# Patient Record
Sex: FEMALE | Race: WHITE | URBAN - METROPOLITAN AREA
[De-identification: names, ages, dates, MRNs, and addresses within clinical notes are randomized per-mention and may not be internally consistent; named-entity substitution may affect disease eponyms.]

---

## 2017-07-06 ENCOUNTER — TELEPHONE (OUTPATIENT)
Dept: FAMILY MEDICINE | Facility: CLINIC | Age: 52
End: 2017-07-06

## 2017-07-06 ENCOUNTER — OFFICE VISIT (OUTPATIENT)
Dept: FAMILY MEDICINE | Facility: CLINIC | Age: 52
End: 2017-07-06

## 2017-07-06 VITALS — WEIGHT: 164 LBS | TEMPERATURE: 98.5 F | HEIGHT: 66 IN

## 2017-07-06 DIAGNOSIS — H60.331 ACUTE SWIMMER'S EAR OF RIGHT SIDE: Primary | ICD-10-CM

## 2017-07-06 PROCEDURE — 99212 OFFICE O/P EST SF 10 MIN: CPT | Performed by: FAMILY MEDICINE

## 2017-07-06 RX ORDER — CIPROFLOXACIN AND DEXAMETHASONE 3; 1 MG/ML; MG/ML
4 SUSPENSION/ DROPS AURICULAR (OTIC) 2 TIMES DAILY
Qty: 7.5 ML | Refills: 0 | Status: SHIPPED | OUTPATIENT
Start: 2017-07-06 | End: 2017-07-13

## 2017-07-06 RX ORDER — NEOMYCIN SULFATE, POLYMYXIN B SULFATE AND HYDROCORTISONE 10; 3.5; 1 MG/ML; MG/ML; [USP'U]/ML
4 SUSPENSION/ DROPS AURICULAR (OTIC) 4 TIMES DAILY
Qty: 10 ML | Refills: 0 | Status: SHIPPED | OUTPATIENT
Start: 2017-07-06

## 2017-07-06 NOTE — PROGRESS NOTES
"SUBJECTIVE:                                                    Judie Gallagher is a 52 year old female who presents to clinic today for the following health issues:    ENT Symptoms             Symptoms: cc Present Absent Comment   Fever/Chills   x    Fatigue   x    Muscle Aches   x    Eye Irritation   x    Sneezing   x    Nasal Raul/Drg   x    Sinus Pressure/Pain   x    Loss of smell   x    Dental pain   x    Sore Throat   x    Swollen Glands   x    Ear Pain/Fullness x x  Right ear and jaw pain   Cough   x    Wheeze   x    Chest Pain   x    Shortness of breath   x    Rash   x    Other   x      Symptom duration:  Saturday   Symptom severity: moderate   Treatments tried:  Advil and excederin   Contacts:  Not that she is aware of. She believes that it is from swimming.         Problem list and histories reviewed & adjusted, as indicated.  Additional history:     There is no problem list on file for this patient.    History reviewed. No pertinent surgical history.    Social History   Substance Use Topics     Smoking status: Never Smoker     Smokeless tobacco: Not on file     Alcohol use Yes      Comment: rare     History reviewed. No pertinent family history.        ROS:  Constitutional, HEENT, cardiovascular, pulmonary, gi and gu systems are negative, except as otherwise noted.    OBJECTIVE:                                                    Temp 98.5  F (36.9  C) (Temporal)  Ht 5' 6\" (1.676 m) There is no height or weight on file to calculate BMI.   GENERAL:: healthy, alert and no distress  HENT: right external canal swollen red with purulant discharge Nose- normal; Mouth- no ulcers, no lesions       ASSESSMENT/PLAN:                                                      (H60.331) Acute swimmer's ear of right side  (primary encounter diagnosis  Plan: ciprofloxacin-dexamethasone (CIPRODEX) otic         suspension        return to clinic or call if unimproved in 3-4 days sooner if worse.         reports that she has never " smoked. She does not have any smokeless tobacco history on file.    [facility]

## 2017-07-06 NOTE — TELEPHONE ENCOUNTER
Reason for Call:  Other prescription    Detailed comments: Judie is at the [facility] and her cipro-dex medication is well over $200.00 and pharmacist was asking if it could be changed to:  Neomycin-polymyxinb sulfate and hydrocodone otic solution 10ml bottle?    Patient @ pharmacy waiting.  Please review and advise. Thank you..Maday Mcclendon    Phone Number Patient can be reached at: Other phone number:  [phone]    Best Time: during business hours    Can we leave a detailed message on this number? YES    Call taken on 7/6/2017 at 11:53 AM by Maday Mcclendon

## 2017-07-06 NOTE — NURSING NOTE
"Chief Complaint   Patient presents with     Ear Problem       Initial There were no vitals taken for this visit. Estimated body mass index is 27.92 kg/(m^2) as calculated from the following:    Height as of 1/21/14: 5' 6\" (1.676 m).    Weight as of 1/21/14: 173 lb (78.5 kg).  Medication Reconciliation: complete   Celine Cortés CMA      "

## 2017-07-06 NOTE — MR AVS SNAPSHOT
"              After Visit Summary   2017    Judie Gallagher    MRN: [id number]           Patient Information     Date Of Birth          1965        Visit Information        Provider Department      2017 10:20 AM Jose Hines MD [facility]        Today's Diagnoses     Acute swimmer's ear of right side    -  1       Follow-ups after your visit        Who to contact     Normal or non-critical lab and imaging results will be communicated to you by Evihart, letter or phone within 4 business days after the clinic has received the results. If you do not hear from us within 7 days, please contact the clinic through MyChart or phone. If you have a critical or abnormal lab result, we will notify you by phone as soon as possible.  Submit refill requests through Moqom or call your pharmacy and they will forward the refill request to us. Please allow 3 business days for your refill to be completed.          If you need to speak with a  for additional information , please call: [phone]             Additional Information About Your Visit        Moqom Information     Moqom lets you send messages to your doctor, view your test results, renew your prescriptions, schedule appointments and more. To sign up, go to [email or web address] . Click on \"Log in\" on the left side of the screen, which will take you to the Welcome page. Then click on \"Sign up Now\" on the right side of the page.     You will be asked to enter the access code listed below, as well as some personal information. Please follow the directions to create your username and password.     Your access code is: [email or web address]  Expires: 10/8/2017  2:28 PM     Your access code will  in 90 days. If you need help or a new code, please call your [facility] clinic or [phone].        Care EveryWhere ID     This is your Care EveryWhere ID. This could be used by other organizations to access your [facility] medical " "records  [id number]        Your Vitals Were     Temperature Height                98.5  F (36.9  C) (Temporal) 5' 6\" (1.676 m)           Blood Pressure from Last 3 Encounters:   01/21/14 155/78   09/20/13 144/74   12/24/12 131/70    Weight from Last 3 Encounters:   01/21/14 173 lb (78.5 kg)              Today, you had the following     No orders found for display         Today's Medication Changes          These changes are accurate as of: 7/6/17 11:59 PM.  If you have any questions, ask your nurse or doctor.               Start taking these medicines.        Dose/Directions    ciprofloxacin-dexamethasone otic suspension   Commonly known as:  CIPRODEX   Used for:  Acute swimmer's ear of right side   Started by:  Jose Hines MD        Dose:  4 drop   Place 4 drops into the right ear 2 times daily for 7 days   Quantity:  7.5 mL   Refills:  0       neomycin-polymyxin-hydrocortisone 3.5-35512-3 otic suspension   Commonly known as:  CORTISPORIN   Used for:  Acute swimmer's ear of right side   Started by:  Jose Hines MD        Dose:  4 drop   Place 4 drops in ear(s) 4 times daily   Quantity:  10 mL   Refills:  0            Where to get your medicines      These medications were sent to [facility] [address] [place] [address]  [address] [facility] [address]     Phone:  [phone]     ciprofloxacin-dexamethasone otic suspension    neomycin-polymyxin-hydrocortisone 3.5-62335-0 otic suspension                Primary Care Provider Office Phone # Fax #    Triston Rodrigues [phone]       [facility] [address] [facility] [address]        Equal Access to Services     [facility]: Hadii aad ku hadasho Soomaali, waaxda luqadaha, qaybta kaalmada adeegyada, cayden saldañan anibal velez. So [facility] [phone].    ATENCIÓN: Si habla español, tiene a monreal disposición servicios gratuitos de asistencia lingüística. Llame al [phone].    We " comply with applicable federal civil rights laws and [place] laws. We do not discriminate on the basis of race, color, national origin, age, disability sex, sexual orientation or gender identity.            Thank you!     Thank you for choosing [facility]  for your care. Our goal is always to provide you with excellent care. Hearing back from our patients is one way we can continue to improve our services. Please take a few minutes to complete the written survey that you may receive in the mail after your visit with us. Thank you!             Your Updated Medication List - Protect others around you: Learn how to safely use, store and throw away your medicines at www.disposemymeds.org.          This list is accurate as of: 7/6/17 11:59 PM.  Always use your most recent med list.                   Brand Name Dispense Instructions for use Diagnosis    ciprofloxacin-dexamethasone otic suspension    CIPRODEX    7.5 mL    Place 4 drops into the right ear 2 times daily for 7 days    Acute swimmer's ear of right side       neomycin-polymyxin-hydrocortisone 3.5-93139-2 otic suspension    CORTISPORIN    10 mL    Place 4 drops in ear(s) 4 times daily    Acute swimmer's ear of right side

## 2017-11-16 ENCOUNTER — TELEPHONE (OUTPATIENT)
Dept: FAMILY MEDICINE | Facility: CLINIC | Age: 52
End: 2017-11-16

## 2017-11-16 NOTE — TELEPHONE ENCOUNTER
Panel Management Review      Patient has the following on her problem list: None      Composite cancer screening  Chart review shows that this patient is due/due soon for the following Pap Smear, Mammogram and Colonoscopy  Summary:    Patient is due/failing the following:   COLONOSCOPY, LDL, MAMMOGRAM, PAP and PHYSICAL    Action needed:   Patient needs office visit for pap and physical., Patient needs fasting lab only appointment and Patient needs referral/order: mammogram and colonoscopy.    Type of outreach:    Phone, left message for patient to call back.     Questions for provider review:    None                                                                                                                                    Celine Cortés CMA       Chart routed to self.

## 2018-01-12 NOTE — TELEPHONE ENCOUNTER
Judie called back and she is not a patient of our clinic.  She does go else. She only came here once in July, 2017 for an ear infection.  Please remove from list.  Thank you..Maday Mcclendon

## 2018-05-18 ENCOUNTER — TELEPHONE (OUTPATIENT)
Dept: FAMILY MEDICINE | Facility: CLINIC | Age: 53
End: 2018-05-18

## 2018-05-18 NOTE — TELEPHONE ENCOUNTER
Panel Management Review      Patient has the following on her problem list: None      Composite cancer screening  Chart review shows that this patient is due/due soon for the following Pap Smear, Mammogram and Colonoscopy  Summary:    Patient is due/failing the following:   physcial, COLONOSCOPY, LDL, MAMMOGRAM and PAP    Action needed:   Patient needs office visit for physical and pap. and Patient needs referral/order: mammogram and colonoscopy    Type of outreach:    please recall    Questions for provider review:    None                                                                                                                                    Celine Cortés CMA     Chart routed to self.

## 2018-05-18 NOTE — LETTER
Felicita 15, 2018      Judie Gallagher  [address] [facility] [address]        Dear Judie,     As part of [facility]'s commitment to health and wellness we have reviewed your chart and it indicates that you are due for one or more of the following:    -- Pap smear. There is no recent pap on file. These are recommended every 3 years. Please call our clinic to schedule your pap smear / physical appointment.     -- Mammogram. There is no recent mammogram on file.   Please call one of the following numbers to schedule:  ** [facility] [phone] (at [facility] once a month)  [facility] [phone]  [facility] [phone]  [facility] [phone]  [facility] [phone]  [facility] [phone]    -- Colon screen. Colonoscopy or FIT test. One of these tests is recommended at age 50 to screen for colon cancer. There is no recent colonoscopy or FIT test on file.   Please call one of the following numbers to schedule a colonoscopy:  [facility] [phone]  [facility] [phone]  [facility] [phone]  [facility] [phone] (multiple sites, call for locations)    A colonoscopy is the gold standard but if you prefer to do a screening that is LESS INVASIVE AND LESS EXPENSIVE there is a test for you! It is called the FIT test. It is a screening test that is done on a yearly basis and can be DONE AT HOME! Do the test at home and mail it in (you don't even have to pay for postage). If you are willing to do this test, we can order the kit for you to  at our clinic. Please call us at [phone] if you need an order for a colonoscopy or FIT testing.    -- Lab tests: You are due for the following: Lipid profile. Please schedule a lab appointment. Plan to come fasting 8-10 hours prior to your appointment - nothing to eat or drink EXCEPT water and your medications.      Please try to schedule and/or complete the tests above within the  next 2-4 weeks.   The number to call to schedule an appointment at [facility] is [phone].    While we work hard to maintain accurate records, it is always possible that this notice does not accurately reflect tests that you may have had. To ensure that we do not send you unnecessary notices please verify that we have accurate dates of your tests, even if these were done many years ago.     Thank you for choosing [facility] for your healthcare needs.      Sincerely,     Jose Hines MD/  [facility] Care Team